# Patient Record
Sex: MALE | Race: WHITE | Employment: FULL TIME | URBAN - METROPOLITAN AREA
[De-identification: names, ages, dates, MRNs, and addresses within clinical notes are randomized per-mention and may not be internally consistent; named-entity substitution may affect disease eponyms.]

---

## 2022-10-27 ENCOUNTER — HOSPITAL ENCOUNTER (OUTPATIENT)
Facility: HOSPITAL | Age: 32
Setting detail: OBSERVATION
Discharge: HOME | End: 2022-10-28
Attending: EMERGENCY MEDICINE | Admitting: INTERNAL MEDICINE
Payer: COMMERCIAL

## 2022-10-27 ENCOUNTER — ANESTHESIA EVENT (OUTPATIENT)
Dept: SURGERY | Facility: HOSPITAL | Age: 32
End: 2022-10-27
Payer: COMMERCIAL

## 2022-10-27 ENCOUNTER — ANESTHESIA (OUTPATIENT)
Dept: SURGERY | Facility: HOSPITAL | Age: 32
End: 2022-10-27
Payer: COMMERCIAL

## 2022-10-27 ENCOUNTER — HOSPITAL ENCOUNTER (OUTPATIENT)
Facility: HOSPITAL | Age: 32
Setting detail: HOSPITAL OUTPATIENT SURGERY
Discharge: HOME | End: 2022-10-27
Attending: ORTHOPAEDIC SURGERY | Admitting: ORTHOPAEDIC SURGERY
Payer: COMMERCIAL

## 2022-10-27 DIAGNOSIS — L03.119 CELLULITIS OF HAND: Primary | ICD-10-CM

## 2022-10-27 LAB
ALBUMIN SERPL-MCNC: 4.1 G/DL (ref 3.4–5)
ALBUMIN/GLOB SERPL: 1.2 {RATIO} (ref 1–2)
ALP LIVER SERPL-CCNC: 33 U/L
ALT SERPL-CCNC: 31 U/L
ANION GAP SERPL CALC-SCNC: 6 MMOL/L (ref 0–18)
APTT PPP: 23.8 SECONDS (ref 23.3–35.6)
AST SERPL-CCNC: 16 U/L (ref 15–37)
BASOPHILS # BLD AUTO: 0.01 X10(3) UL (ref 0–0.2)
BASOPHILS NFR BLD AUTO: 0.1 %
BILIRUB SERPL-MCNC: 0.4 MG/DL (ref 0.1–2)
BUN BLD-MCNC: 12 MG/DL (ref 7–18)
CALCIUM BLD-MCNC: 9.7 MG/DL (ref 8.5–10.1)
CHLORIDE SERPL-SCNC: 108 MMOL/L (ref 98–112)
CO2 SERPL-SCNC: 24 MMOL/L (ref 21–32)
CREAT BLD-MCNC: 1.13 MG/DL
EOSINOPHIL # BLD AUTO: 0 X10(3) UL (ref 0–0.7)
EOSINOPHIL NFR BLD AUTO: 0 %
ERYTHROCYTE [DISTWIDTH] IN BLOOD BY AUTOMATED COUNT: 12.7 %
GFR SERPLBLD BASED ON 1.73 SQ M-ARVRAT: 89 ML/MIN/1.73M2 (ref 60–?)
GLOBULIN PLAS-MCNC: 3.5 G/DL (ref 2.8–4.4)
GLUCOSE BLD-MCNC: 142 MG/DL (ref 70–99)
HCT VFR BLD AUTO: 41.7 %
HGB BLD-MCNC: 14.5 G/DL
IMM GRANULOCYTES # BLD AUTO: 0.04 X10(3) UL (ref 0–1)
IMM GRANULOCYTES NFR BLD: 0.4 %
INR BLD: 0.98 (ref 0.85–1.16)
LYMPHOCYTES # BLD AUTO: 0.49 X10(3) UL (ref 1–4)
LYMPHOCYTES NFR BLD AUTO: 5.4 %
MCH RBC QN AUTO: 32.2 PG (ref 26–34)
MCHC RBC AUTO-ENTMCNC: 34.8 G/DL (ref 31–37)
MCV RBC AUTO: 92.7 FL
MONOCYTES # BLD AUTO: 0.09 X10(3) UL (ref 0.1–1)
MONOCYTES NFR BLD AUTO: 1 %
NEUTROPHILS # BLD AUTO: 8.44 X10 (3) UL (ref 1.5–7.7)
NEUTROPHILS # BLD AUTO: 8.44 X10(3) UL (ref 1.5–7.7)
NEUTROPHILS NFR BLD AUTO: 93.1 %
OSMOLALITY SERPL CALC.SUM OF ELEC: 288 MOSM/KG (ref 275–295)
PLATELET # BLD AUTO: 264 10(3)UL (ref 150–450)
POTASSIUM SERPL-SCNC: 4.2 MMOL/L (ref 3.5–5.1)
PROT SERPL-MCNC: 7.6 G/DL (ref 6.4–8.2)
PROTHROMBIN TIME: 13 SECONDS (ref 11.6–14.8)
RBC # BLD AUTO: 4.5 X10(6)UL
SARS-COV-2 RNA RESP QL NAA+PROBE: NOT DETECTED
SODIUM SERPL-SCNC: 138 MMOL/L (ref 136–145)
WBC # BLD AUTO: 9.1 X10(3) UL (ref 4–11)

## 2022-10-27 PROCEDURE — 87205 SMEAR GRAM STAIN: CPT | Performed by: ORTHOPAEDIC SURGERY

## 2022-10-27 PROCEDURE — 0HBFXZZ EXCISION OF RIGHT HAND SKIN, EXTERNAL APPROACH: ICD-10-PCS | Performed by: ORTHOPAEDIC SURGERY

## 2022-10-27 PROCEDURE — 85610 PROTHROMBIN TIME: CPT | Performed by: EMERGENCY MEDICINE

## 2022-10-27 PROCEDURE — 87077 CULTURE AEROBIC IDENTIFY: CPT | Performed by: ORTHOPAEDIC SURGERY

## 2022-10-27 PROCEDURE — 0H9FXZZ DRAINAGE OF RIGHT HAND SKIN, EXTERNAL APPROACH: ICD-10-PCS | Performed by: ORTHOPAEDIC SURGERY

## 2022-10-27 PROCEDURE — 87070 CULTURE OTHR SPECIMN AEROBIC: CPT | Performed by: ORTHOPAEDIC SURGERY

## 2022-10-27 PROCEDURE — 87075 CULTR BACTERIA EXCEPT BLOOD: CPT | Performed by: ORTHOPAEDIC SURGERY

## 2022-10-27 PROCEDURE — 87176 TISSUE HOMOGENIZATION CULTR: CPT | Performed by: ORTHOPAEDIC SURGERY

## 2022-10-27 PROCEDURE — 96374 THER/PROPH/DIAG INJ IV PUSH: CPT

## 2022-10-27 PROCEDURE — 96375 TX/PRO/DX INJ NEW DRUG ADDON: CPT

## 2022-10-27 PROCEDURE — 80053 COMPREHEN METABOLIC PANEL: CPT | Performed by: EMERGENCY MEDICINE

## 2022-10-27 PROCEDURE — 87186 SC STD MICRODIL/AGAR DIL: CPT | Performed by: ORTHOPAEDIC SURGERY

## 2022-10-27 PROCEDURE — 99285 EMERGENCY DEPT VISIT HI MDM: CPT

## 2022-10-27 PROCEDURE — 85730 THROMBOPLASTIN TIME PARTIAL: CPT | Performed by: EMERGENCY MEDICINE

## 2022-10-27 PROCEDURE — 85025 COMPLETE CBC W/AUTO DIFF WBC: CPT | Performed by: EMERGENCY MEDICINE

## 2022-10-27 RX ORDER — HYDROMORPHONE HYDROCHLORIDE 1 MG/ML
0.4 INJECTION, SOLUTION INTRAMUSCULAR; INTRAVENOUS; SUBCUTANEOUS EVERY 2 HOUR PRN
Status: DISCONTINUED | OUTPATIENT
Start: 2022-10-27 | End: 2022-10-28

## 2022-10-27 RX ORDER — METOCLOPRAMIDE HYDROCHLORIDE 5 MG/ML
INJECTION INTRAMUSCULAR; INTRAVENOUS AS NEEDED
Status: DISCONTINUED | OUTPATIENT
Start: 2022-10-27 | End: 2022-10-27 | Stop reason: SURG

## 2022-10-27 RX ORDER — ONDANSETRON 2 MG/ML
4 INJECTION INTRAMUSCULAR; INTRAVENOUS EVERY 4 HOURS PRN
Status: CANCELLED | OUTPATIENT
Start: 2022-10-27 | End: 2022-10-27

## 2022-10-27 RX ORDER — HYDROCODONE BITARTRATE AND ACETAMINOPHEN 5; 325 MG/1; MG/1
1-2 TABLET ORAL EVERY 6 HOURS PRN
Status: DISCONTINUED | OUTPATIENT
Start: 2022-10-27 | End: 2022-10-27

## 2022-10-27 RX ORDER — MORPHINE SULFATE 4 MG/ML
4 INJECTION, SOLUTION INTRAMUSCULAR; INTRAVENOUS ONCE
Status: COMPLETED | OUTPATIENT
Start: 2022-10-27 | End: 2022-10-27

## 2022-10-27 RX ORDER — MORPHINE SULFATE 2 MG/ML
1 INJECTION, SOLUTION INTRAMUSCULAR; INTRAVENOUS EVERY 2 HOUR PRN
Status: DISCONTINUED | OUTPATIENT
Start: 2022-10-27 | End: 2022-10-28

## 2022-10-27 RX ORDER — HYDROCODONE BITARTRATE AND ACETAMINOPHEN 5; 325 MG/1; MG/1
1 TABLET ORAL EVERY 4 HOURS PRN
Status: ON HOLD | COMMUNITY
Start: 2022-10-26 | End: 2022-10-28

## 2022-10-27 RX ORDER — SULFAMETHOXAZOLE AND TRIMETHOPRIM 800; 160 MG/1; MG/1
1 TABLET ORAL 2 TIMES DAILY
COMMUNITY
Start: 2022-10-26 | End: 2022-10-28

## 2022-10-27 RX ORDER — PANTOPRAZOLE SODIUM 40 MG/1
40 TABLET, DELAYED RELEASE ORAL
Status: DISCONTINUED | OUTPATIENT
Start: 2022-10-28 | End: 2022-10-27

## 2022-10-27 RX ORDER — HYDROMORPHONE HYDROCHLORIDE 1 MG/ML
0.1 INJECTION, SOLUTION INTRAMUSCULAR; INTRAVENOUS; SUBCUTANEOUS EVERY 2 HOUR PRN
Status: DISCONTINUED | OUTPATIENT
Start: 2022-10-27 | End: 2022-10-28

## 2022-10-27 RX ORDER — NALOXONE HYDROCHLORIDE 0.4 MG/ML
80 INJECTION, SOLUTION INTRAMUSCULAR; INTRAVENOUS; SUBCUTANEOUS AS NEEDED
Status: DISCONTINUED | OUTPATIENT
Start: 2022-10-27 | End: 2022-10-27 | Stop reason: HOSPADM

## 2022-10-27 RX ORDER — DEXAMETHASONE SODIUM PHOSPHATE 4 MG/ML
VIAL (ML) INJECTION AS NEEDED
Status: DISCONTINUED | OUTPATIENT
Start: 2022-10-27 | End: 2022-10-27 | Stop reason: SURG

## 2022-10-27 RX ORDER — CEFAZOLIN SODIUM 1 G/3ML
INJECTION, POWDER, FOR SOLUTION INTRAMUSCULAR; INTRAVENOUS AS NEEDED
Status: DISCONTINUED | OUTPATIENT
Start: 2022-10-27 | End: 2022-10-27 | Stop reason: SURG

## 2022-10-27 RX ORDER — HYDROCODONE BITARTRATE AND ACETAMINOPHEN 5; 325 MG/1; MG/1
1 TABLET ORAL ONCE AS NEEDED
Status: COMPLETED | OUTPATIENT
Start: 2022-10-27 | End: 2022-10-27

## 2022-10-27 RX ORDER — MIDAZOLAM HYDROCHLORIDE 1 MG/ML
1 INJECTION INTRAMUSCULAR; INTRAVENOUS EVERY 5 MIN PRN
Status: DISCONTINUED | OUTPATIENT
Start: 2022-10-27 | End: 2022-10-27 | Stop reason: HOSPADM

## 2022-10-27 RX ORDER — ONDANSETRON 2 MG/ML
4 INJECTION INTRAMUSCULAR; INTRAVENOUS EVERY 6 HOURS PRN
Status: DISCONTINUED | OUTPATIENT
Start: 2022-10-27 | End: 2022-10-28

## 2022-10-27 RX ORDER — HYDROCODONE BITARTRATE AND ACETAMINOPHEN 5; 325 MG/1; MG/1
TABLET ORAL
Status: COMPLETED
Start: 2022-10-27 | End: 2022-10-27

## 2022-10-27 RX ORDER — MORPHINE SULFATE 2 MG/ML
0.5 INJECTION, SOLUTION INTRAMUSCULAR; INTRAVENOUS EVERY 2 HOUR PRN
Status: DISCONTINUED | OUTPATIENT
Start: 2022-10-27 | End: 2022-10-28

## 2022-10-27 RX ORDER — HYDROMORPHONE HYDROCHLORIDE 1 MG/ML
0.4 INJECTION, SOLUTION INTRAMUSCULAR; INTRAVENOUS; SUBCUTANEOUS EVERY 5 MIN PRN
Status: DISCONTINUED | OUTPATIENT
Start: 2022-10-27 | End: 2022-10-27 | Stop reason: HOSPADM

## 2022-10-27 RX ORDER — ACETAMINOPHEN 10 MG/ML
1000 INJECTION, SOLUTION INTRAVENOUS EVERY 6 HOURS PRN
Status: DISCONTINUED | OUTPATIENT
Start: 2022-10-27 | End: 2022-10-28

## 2022-10-27 RX ORDER — HYDROCODONE BITARTRATE AND ACETAMINOPHEN 5; 325 MG/1; MG/1
1 TABLET ORAL EVERY 4 HOURS PRN
Status: DISCONTINUED | OUTPATIENT
Start: 2022-10-27 | End: 2022-10-28

## 2022-10-27 RX ORDER — SODIUM CHLORIDE, SODIUM LACTATE, POTASSIUM CHLORIDE, CALCIUM CHLORIDE 600; 310; 30; 20 MG/100ML; MG/100ML; MG/100ML; MG/100ML
INJECTION, SOLUTION INTRAVENOUS CONTINUOUS
Status: DISCONTINUED | OUTPATIENT
Start: 2022-10-27 | End: 2022-10-27 | Stop reason: HOSPADM

## 2022-10-27 RX ORDER — ONDANSETRON 2 MG/ML
4 INJECTION INTRAMUSCULAR; INTRAVENOUS EVERY 6 HOURS PRN
Status: DISCONTINUED | OUTPATIENT
Start: 2022-10-27 | End: 2022-10-27 | Stop reason: HOSPADM

## 2022-10-27 RX ORDER — HYDROMORPHONE HYDROCHLORIDE 1 MG/ML
0.2 INJECTION, SOLUTION INTRAMUSCULAR; INTRAVENOUS; SUBCUTANEOUS EVERY 5 MIN PRN
Status: DISCONTINUED | OUTPATIENT
Start: 2022-10-27 | End: 2022-10-27 | Stop reason: HOSPADM

## 2022-10-27 RX ORDER — CEFAZOLIN SODIUM/WATER 2 G/20 ML
2 SYRINGE (ML) INTRAVENOUS EVERY 8 HOURS
Status: DISCONTINUED | OUTPATIENT
Start: 2022-10-27 | End: 2022-10-27

## 2022-10-27 RX ORDER — HYDROMORPHONE HYDROCHLORIDE 1 MG/ML
0.2 INJECTION, SOLUTION INTRAMUSCULAR; INTRAVENOUS; SUBCUTANEOUS EVERY 2 HOUR PRN
Status: DISCONTINUED | OUTPATIENT
Start: 2022-10-27 | End: 2022-10-28

## 2022-10-27 RX ORDER — HYDROMORPHONE HYDROCHLORIDE 1 MG/ML
0.6 INJECTION, SOLUTION INTRAMUSCULAR; INTRAVENOUS; SUBCUTANEOUS EVERY 5 MIN PRN
Status: DISCONTINUED | OUTPATIENT
Start: 2022-10-27 | End: 2022-10-27 | Stop reason: HOSPADM

## 2022-10-27 RX ORDER — MIDAZOLAM HYDROCHLORIDE 1 MG/ML
INJECTION INTRAMUSCULAR; INTRAVENOUS AS NEEDED
Status: DISCONTINUED | OUTPATIENT
Start: 2022-10-27 | End: 2022-10-27 | Stop reason: SURG

## 2022-10-27 RX ORDER — SODIUM CHLORIDE 9 MG/ML
INJECTION, SOLUTION INTRAVENOUS CONTINUOUS
Status: DISCONTINUED | OUTPATIENT
Start: 2022-10-27 | End: 2022-10-28

## 2022-10-27 RX ORDER — PANTOPRAZOLE SODIUM 40 MG/1
40 TABLET, DELAYED RELEASE ORAL
Status: DISCONTINUED | OUTPATIENT
Start: 2022-10-28 | End: 2022-10-28

## 2022-10-27 RX ORDER — LIDOCAINE HYDROCHLORIDE 10 MG/ML
INJECTION, SOLUTION EPIDURAL; INFILTRATION; INTRACAUDAL; PERINEURAL AS NEEDED
Status: DISCONTINUED | OUTPATIENT
Start: 2022-10-27 | End: 2022-10-27 | Stop reason: SURG

## 2022-10-27 RX ORDER — ACETAMINOPHEN 500 MG
1000 TABLET ORAL ONCE AS NEEDED
Status: COMPLETED | OUTPATIENT
Start: 2022-10-27 | End: 2022-10-27

## 2022-10-27 RX ORDER — MORPHINE SULFATE 4 MG/ML
4 INJECTION, SOLUTION INTRAMUSCULAR; INTRAVENOUS
Status: CANCELLED | OUTPATIENT
Start: 2022-10-27 | End: 2022-10-27

## 2022-10-27 RX ORDER — MORPHINE SULFATE 2 MG/ML
2 INJECTION, SOLUTION INTRAMUSCULAR; INTRAVENOUS EVERY 2 HOUR PRN
Status: DISCONTINUED | OUTPATIENT
Start: 2022-10-27 | End: 2022-10-28

## 2022-10-27 RX ORDER — MEPERIDINE HYDROCHLORIDE 25 MG/ML
25 INJECTION INTRAMUSCULAR; INTRAVENOUS; SUBCUTANEOUS
Status: DISCONTINUED | OUTPATIENT
Start: 2022-10-27 | End: 2022-10-27 | Stop reason: HOSPADM

## 2022-10-27 RX ORDER — KETOROLAC TROMETHAMINE 30 MG/ML
INJECTION, SOLUTION INTRAMUSCULAR; INTRAVENOUS AS NEEDED
Status: DISCONTINUED | OUTPATIENT
Start: 2022-10-27 | End: 2022-10-27 | Stop reason: SURG

## 2022-10-27 RX ORDER — PREDNISONE 10 MG/1
TABLET ORAL
COMMUNITY
Start: 2022-10-05

## 2022-10-27 RX ORDER — HYDROCODONE BITARTRATE AND ACETAMINOPHEN 5; 325 MG/1; MG/1
2 TABLET ORAL ONCE AS NEEDED
Status: COMPLETED | OUTPATIENT
Start: 2022-10-27 | End: 2022-10-27

## 2022-10-27 RX ADMIN — LIDOCAINE HYDROCHLORIDE 10 MG: 10 INJECTION, SOLUTION EPIDURAL; INFILTRATION; INTRACAUDAL; PERINEURAL at 19:37:00

## 2022-10-27 RX ADMIN — DEXAMETHASONE SODIUM PHOSPHATE 4 MG: 4 MG/ML VIAL (ML) INJECTION at 20:06:00

## 2022-10-27 RX ADMIN — KETOROLAC TROMETHAMINE 30 MG: 30 INJECTION, SOLUTION INTRAMUSCULAR; INTRAVENOUS at 20:06:00

## 2022-10-27 RX ADMIN — CEFAZOLIN SODIUM 2 G: 1 INJECTION, POWDER, FOR SOLUTION INTRAMUSCULAR; INTRAVENOUS at 19:54:00

## 2022-10-27 RX ADMIN — METOCLOPRAMIDE HYDROCHLORIDE 10 MG: 5 INJECTION INTRAMUSCULAR; INTRAVENOUS at 20:06:00

## 2022-10-27 RX ADMIN — MIDAZOLAM HYDROCHLORIDE 2 MG: 1 INJECTION INTRAMUSCULAR; INTRAVENOUS at 19:34:00

## 2022-10-27 NOTE — INTERVAL H&P NOTE
Pre-op Diagnosis: RIGHT SHEPPARD SPACE INFECTION    The above referenced H&P was reviewed by Geronimo Wolf MD on 10/27/2022, the patient was examined and no significant changes have occurred in the patient's condition since the H&P was performed. I discussed with the patient and/or legal representative the potential benefits, risks and side effects of this procedure; the likelihood of the patient achieving goals; and potential problems that might occur during recuperation. I discussed reasonable alternatives to the procedure, including risks, benefits and side effects related to the alternatives and risks related to not receiving this procedure. We will proceed with procedure as planned.

## 2022-10-27 NOTE — ED INITIAL ASSESSMENT (HPI)
Pt is here per dr. Walter Brito surgeon to receive IV antibiotics before getting and I & D later today for abscess to right hand.

## 2022-10-27 NOTE — INTERVAL H&P NOTE
Pre-op Diagnosis: RIGHT SHEPPARD SPACE INFECTION    The above referenced H&P was reviewed by Mundo Early MD on 10/27/2022, the patient was examined and no significant changes have occurred in the patient's condition since the H&P was performed. I discussed with the patient and/or legal representative the potential benefits, risks and side effects of this procedure; the likelihood of the patient achieving goals; and potential problems that might occur during recuperation. I discussed reasonable alternatives to the procedure, including risks, benefits and side effects related to the alternatives and risks related to not receiving this procedure. We will proceed with procedure as planned.

## 2022-10-28 VITALS
DIASTOLIC BLOOD PRESSURE: 65 MMHG | TEMPERATURE: 98 F | OXYGEN SATURATION: 99 % | WEIGHT: 214.63 LBS | HEART RATE: 66 BPM | RESPIRATION RATE: 14 BRPM | SYSTOLIC BLOOD PRESSURE: 127 MMHG | BODY MASS INDEX: 26.69 KG/M2 | HEIGHT: 75 IN

## 2022-10-28 LAB
ANION GAP SERPL CALC-SCNC: 6 MMOL/L (ref 0–18)
BASOPHILS # BLD AUTO: 0.03 X10(3) UL (ref 0–0.2)
BASOPHILS NFR BLD AUTO: 0.3 %
BUN BLD-MCNC: 13 MG/DL (ref 7–18)
CALCIUM BLD-MCNC: 9.1 MG/DL (ref 8.5–10.1)
CHLORIDE SERPL-SCNC: 109 MMOL/L (ref 98–112)
CO2 SERPL-SCNC: 25 MMOL/L (ref 21–32)
CREAT BLD-MCNC: 1.02 MG/DL
EOSINOPHIL # BLD AUTO: 0.01 X10(3) UL (ref 0–0.7)
EOSINOPHIL NFR BLD AUTO: 0.1 %
ERYTHROCYTE [DISTWIDTH] IN BLOOD BY AUTOMATED COUNT: 12.9 %
GFR SERPLBLD BASED ON 1.73 SQ M-ARVRAT: 101 ML/MIN/1.73M2 (ref 60–?)
GLUCOSE BLD-MCNC: 124 MG/DL (ref 70–99)
HCT VFR BLD AUTO: 38.9 %
HGB BLD-MCNC: 13.2 G/DL
IMM GRANULOCYTES # BLD AUTO: 0.03 X10(3) UL (ref 0–1)
IMM GRANULOCYTES NFR BLD: 0.3 %
LYMPHOCYTES # BLD AUTO: 1.52 X10(3) UL (ref 1–4)
LYMPHOCYTES NFR BLD AUTO: 13.1 %
MCH RBC QN AUTO: 31.7 PG (ref 26–34)
MCHC RBC AUTO-ENTMCNC: 33.9 G/DL (ref 31–37)
MCV RBC AUTO: 93.5 FL
MONOCYTES # BLD AUTO: 0.85 X10(3) UL (ref 0.1–1)
MONOCYTES NFR BLD AUTO: 7.3 %
NEUTROPHILS # BLD AUTO: 9.13 X10 (3) UL (ref 1.5–7.7)
NEUTROPHILS # BLD AUTO: 9.13 X10(3) UL (ref 1.5–7.7)
NEUTROPHILS NFR BLD AUTO: 78.9 %
OSMOLALITY SERPL CALC.SUM OF ELEC: 292 MOSM/KG (ref 275–295)
PLATELET # BLD AUTO: 240 10(3)UL (ref 150–450)
POTASSIUM SERPL-SCNC: 4.1 MMOL/L (ref 3.5–5.1)
RBC # BLD AUTO: 4.16 X10(6)UL
SODIUM SERPL-SCNC: 140 MMOL/L (ref 136–145)
WBC # BLD AUTO: 11.6 X10(3) UL (ref 4–11)

## 2022-10-28 PROCEDURE — 85025 COMPLETE CBC W/AUTO DIFF WBC: CPT | Performed by: HOSPITALIST

## 2022-10-28 PROCEDURE — 80048 BASIC METABOLIC PNL TOTAL CA: CPT | Performed by: HOSPITALIST

## 2022-10-28 RX ORDER — CEPHALEXIN 500 MG/1
1000 CAPSULE ORAL 3 TIMES DAILY
Qty: 78 CAPSULE | Refills: 0 | Status: SHIPPED
Start: 2022-10-28 | End: 2022-10-28

## 2022-10-28 RX ORDER — HYDROCODONE BITARTRATE AND ACETAMINOPHEN 5; 325 MG/1; MG/1
1 TABLET ORAL EVERY 6 HOURS PRN
Qty: 30 TABLET | Refills: 0 | Status: SHIPPED | OUTPATIENT
Start: 2022-10-28 | End: 2022-10-28

## 2022-10-28 RX ORDER — VANCOMYCIN HYDROCHLORIDE
15 EVERY 12 HOURS
Status: DISCONTINUED | OUTPATIENT
Start: 2022-10-28 | End: 2022-10-28

## 2022-10-28 RX ORDER — HYDROCODONE BITARTRATE AND ACETAMINOPHEN 10; 325 MG/1; MG/1
1 TABLET ORAL EVERY 4 HOURS PRN
Status: DISCONTINUED | OUTPATIENT
Start: 2022-10-28 | End: 2022-10-28

## 2022-10-28 RX ORDER — VANCOMYCIN 2 GRAM/500 ML IN 0.9 % SODIUM CHLORIDE INTRAVENOUS
25 ONCE
Status: COMPLETED | OUTPATIENT
Start: 2022-10-28 | End: 2022-10-28

## 2022-10-28 RX ORDER — HYDROCODONE BITARTRATE AND ACETAMINOPHEN 10; 325 MG/1; MG/1
1-2 TABLET ORAL EVERY 6 HOURS PRN
Qty: 30 TABLET | Refills: 0 | Status: SHIPPED | OUTPATIENT
Start: 2022-10-28

## 2022-10-28 RX ORDER — HYDROCODONE BITARTRATE AND ACETAMINOPHEN 10; 325 MG/1; MG/1
1 TABLET ORAL EVERY 6 HOURS PRN
Status: DISCONTINUED | OUTPATIENT
Start: 2022-10-28 | End: 2022-10-28

## 2022-10-28 RX ORDER — CEPHALEXIN 500 MG/1
1000 CAPSULE ORAL 3 TIMES DAILY
Qty: 78 CAPSULE | Refills: 0 | Status: SHIPPED | OUTPATIENT
Start: 2022-10-28 | End: 2022-11-10

## 2022-10-28 RX ORDER — HYDROCODONE BITARTRATE AND ACETAMINOPHEN 10; 325 MG/1; MG/1
2 TABLET ORAL EVERY 6 HOURS PRN
Status: DISCONTINUED | OUTPATIENT
Start: 2022-10-28 | End: 2022-10-28

## 2022-10-28 RX ORDER — HYDROCODONE BITARTRATE AND ACETAMINOPHEN 10; 325 MG/1; MG/1
2 TABLET ORAL EVERY 4 HOURS PRN
Status: DISCONTINUED | OUTPATIENT
Start: 2022-10-28 | End: 2022-10-28

## 2022-10-28 RX ORDER — SENNA AND DOCUSATE SODIUM 50; 8.6 MG/1; MG/1
1 TABLET, FILM COATED ORAL 2 TIMES DAILY
Qty: 60 TABLET | Refills: 0 | Status: SHIPPED | OUTPATIENT
Start: 2022-10-28

## 2022-10-28 NOTE — ANESTHESIA POSTPROCEDURE EVALUATION
[id number] Patient Status:  Emergency   Age/Gender 32year old male MRN [id number]   [facility] SURGERY Attending Farzaneh Barbosa MD   Hosp Day # 0 PCP Dalbert Merlin, MD       Anesthesia Post-op Note    INCISION AND DRAINAGE OF RIGHT PALM    Procedure Summary     Date: 10/27/22 Room / Location: [facility] MAIN OR 03 / [facility] MAIN OR    Anesthesia Start: 1934 Anesthesia Stop: 2015    Procedure: INCISION AND DRAINAGE OF RIGHT PALM (Right) Diagnosis: (RIGHT SHEPPARD SPACE INFECTION)    Surgeons: Farzaneh Barbosa MD Anesthesiologist: Doreen Manuel MD    Anesthesia Type: general ASA Status: 1          Anesthesia Type: general    Vitals Value Taken Time   /92 10/27/22 2018   Temp 98 10/27/22 2020   Pulse 88 10/27/22 2019   Resp 14 10/27/22 2019   SpO2 97 % 10/27/22 2019   Vitals shown include unvalidated device data. Patient Location: PACU    Anesthesia Type: general    Airway Patency: patent    Postop Pain Control: adequate    Mental Status: mildly sedated but able to meaningfully participate in the post-anesthesia evaluation    Nausea/Vomiting: none    Cardiopulmonary/Hydration status: stable euvolemic    Complications: no apparent anesthesia related complications    Postop vital signs: stable    Dental Exam: Unchanged from Preop    Patient to be discharged from PACU when criteria met.

## 2022-10-28 NOTE — PLAN OF CARE
NURSING ADMISSION NOTE      Patient admitted via bed  Oriented to room. Safety precautions initiated. Bed in low position. Call light in reach. Received pt from PACU after I&D performed to R hand palm. Patient is A&O x4.   Calm and cooperative. VSS. On RA. IV-0.9%  ml started. IV abx. R hand with ace bandage dressing in place-CDI. Maintaining R hand elevated. C/o pain to R hand, Morphine prn and norco prn given per [facility]. Regular diet, denies N/V, tolerated well. Bed at lowest position. Call light within reach. All needs mets at this time.

## 2022-10-28 NOTE — BRIEF OP NOTE
Pre-Operative Diagnosis: RIGHT SHEPPARD SPACE INFECTION     Post-Operative Diagnosis: * No post-op diagnosis entered *      Procedure Performed:   INCISION AND DRAINAGE OF RIGHT PALM    Surgeon(s) and Role:     Nitza Jones MD - Primary    Assistant(s):        Surgical Findings:       Specimen: aerobic and anaerobic cultures     Estimated Blood Loss: No data recorded    Dictation Number:       Cori Ceja MD  10/27/2022  8:03 PM

## 2022-10-28 NOTE — OPERATIVE REPORT
[facility]    PATIENT'S NAME: Christina Kramer   ATTENDING PHYSICIAN: Kelsea Etienne M.D. OPERATING PHYSICIAN: Kelsea Etienne M.D. PATIENT ACCOUNT#:   [de-identified]    LOCATION:  [address] [place] [address]  MEDICAL RECORD #:   [id number]       YOB: 1990  ADMISSION DATE:       10/27/2022      OPERATION DATE:  10/27/2022    OPERATIVE REPORT      PREOPERATIVE DIAGNOSIS:  Thenar space abscess, right palm. POSTOPERATIVE DIAGNOSIS:  Thenar space abscess, right palm. PROCEDURE:  Incision and drainage, abscess of right palm. ANESTHESIA:  General.    OPERATIVE TECHNIQUE:  After induction of anesthesia, the arm was prepped and draped in sterile fashion. The forearm up was exsanguinated and tourniquet elevated to 250 mmHg. An oblique incision was made following the skin lines. A clamp was used to spread down opening the deep tissue and a small amount of purulent material was expressed. This was sent for aerobic and anaerobic cultures. Copious irrigation was performed. There was some ulceration around the superficial skin which was removed. This was sharply debrided with a tenotomy scissors. The dressings then applied and tourniquet let down. Patient was taken to the postanesthesia recovery room in stable condition. No complications.      Dictated By Kelsea Etienne M.D.  d: 10/27/2022 20:11:07  t: 10/27/2022 23:31:02  [facility] [id number]  [facility]/

## 2022-10-28 NOTE — ANESTHESIA PROCEDURE NOTES
Airway  Date/Time: 10/27/2022 7:38 PM  Urgency: elective    Airway not difficult    General Information and Staff    Patient location during procedure: OR  Anesthesiologist: Laurent Lopez MD  Performed: anesthesiologist     Indications and Patient Condition  Indications for airway management: anesthesia  Spontaneous ventilation: present  Sedation level: deep  Preoxygenated: yes  Patient position: sniffing  Mask difficulty assessment: 1 - vent by mask    Final Airway Details  Final airway type: supraglottic airway      Successful airway: classic  Size 4       Number of attempts at approach: 1  Ventilation between attempts: none  Number of other approaches attempted: 0

## 2022-10-28 NOTE — PROGRESS NOTES
[facility] Dosing Service    Initial Pharmacokinetic Consult for Vancomycin AUC Dosing    Moses Hamman is a 32year old patient who is being treated for cellulitis. Pharmacy has been asked to dose vancomycin by Dr Maggy Nolen. Weights:  Ideal body weight: 84.5 kg (186 lb 4.6 oz)  Adjusted ideal body weight: 89.6 kg (197 lb 9.8 oz)  Actual weight:  97.3 kg (214 lb 9.6 oz)    Labs:  Lab Results   Component Value Date    CREATSERUM 1.13 10/27/2022      CrCl:  Estimated Creatinine Clearance: 113.2 mL/min (based on SCr of 1.13 mg/dL). Based on the above:    1. This patient will receive a loading dose of Vancomycin  2000 mg IVPB (25mg/kg, capped at 2000 mg) x 1 dose. This will be followed by 1500 mg Q 12 hours based upon actual body weight of 97.3 kg and renal function. 2. Vancomycin peak and trough will be obtained at steady state in order to calculate AUC24. Goal AUC24 is 400-600 mg-h/L.    3. Pharmacy will order SCr as clinically indicated while on vancomycin to assess renal function. 4. Pharmacy will follow and monitor renal function, toxicity and efficacy. We appreciate the opportunity to assist in the care of this patient.     Mei Moraes, [facility]  10/28/2022  4:58 AM  [address] [place] [address] Extension: [phone]

## 2022-10-28 NOTE — PLAN OF CARE
Assumed care at 0730. Pt a/ox4, VSS, on RA. Pt with pain to right hand, PRN medications per [facility] with relief noted. No c/o SOB, dizziness/lightheadedness, n/v/d. IV zosyn q8h, vanco q12h. Tolerating regular diet. IVF running 0.9NS at 100mL/hr. Pt ok to shower today. Updated on POC. Will continue to monitor. Zosyn and Vanco discontinued by ID. Rocephin q24h ordered. OK to discharge home from ID standpoint.      Problem: Patient/Family Goals  Goal: Patient/Family Long Term Goal  Description: Patient's Long Term Goal:Go home    Interventions:  - Ortho/ID consults  -IV AB  -I&D  - See additional Care Plan goals for specific interventions  Outcome: Progressing  Goal: Patient/Family Short Term Goal  Description: Patient's Short Term Goal: Pain control    Interventions:   - PRN medications  -Elevating RUE  - See additional Care Plan goals for specific interventions  Outcome: Progressing

## 2022-10-28 NOTE — PROGRESS NOTES
NURSING DISCHARGE NOTE    Discharged Home via Wheelchair. Accompanied by Support staff  Belongings Taken by patient/family. Pt discharged home. Discharge paperwork given to pt. Pt understands the importance of f/u and taking medications as prescribed. Paper prescription for norco given to pt, all others electronically sent to pt pharmacy by MD. IV removed. All questions answered.

## 2024-10-12 ENCOUNTER — OFFICE VISIT (OUTPATIENT)
Dept: FAMILY MEDICINE CLINIC | Facility: CLINIC | Age: 34
End: 2024-10-12
Payer: COMMERCIAL

## 2024-10-12 VITALS
SYSTOLIC BLOOD PRESSURE: 138 MMHG | TEMPERATURE: 99 F | HEART RATE: 101 BPM | DIASTOLIC BLOOD PRESSURE: 88 MMHG | OXYGEN SATURATION: 98 %

## 2024-10-12 DIAGNOSIS — H66.001 NON-RECURRENT ACUTE SUPPURATIVE OTITIS MEDIA OF RIGHT EAR WITHOUT SPONTANEOUS RUPTURE OF TYMPANIC MEMBRANE: Primary | ICD-10-CM

## 2024-10-12 PROCEDURE — 3075F SYST BP GE 130 - 139MM HG: CPT | Performed by: PHYSICIAN ASSISTANT

## 2024-10-12 PROCEDURE — 3079F DIAST BP 80-89 MM HG: CPT | Performed by: PHYSICIAN ASSISTANT

## 2024-10-12 PROCEDURE — 99213 OFFICE O/P EST LOW 20 MIN: CPT | Performed by: PHYSICIAN ASSISTANT

## 2024-10-12 RX ORDER — AMOXICILLIN 875 MG
875 TABLET ORAL 2 TIMES DAILY
Qty: 20 TABLET | Refills: 0 | Status: SHIPPED | OUTPATIENT
Start: 2024-10-12 | End: 2024-10-22

## 2024-10-12 NOTE — PROGRESS NOTES
CHIEF COMPLAINT:   No chief complaint on file.      HPI:   Sarthak Hoang is a 33 year old male who presents for upper respiratory symptoms for 3 days. (+) R ear pain, sore throat, nasal congestion, PND, productive cough.  APAP/IBU prn pain, has not taken temp.  Denies prior h/o ENT surgeries.  Social tobacco use.   Denies CP, no SOB/MORAN, no n/v/d, no rash.   Omeprazole OTC daily, no other Rx meds.   Denies h/o asthma or lung dz.   No home COVID-19 testing.     2 younger children at home with URI sx, no other known ill contacts.       Current Outpatient Medications   Medication Sig Dispense Refill    amoxicillin 875 MG Oral Tab Take 1 tablet (875 mg total) by mouth 2 (two) times daily for 10 days. 20 tablet 0    HYDROcodone-acetaminophen  MG Oral Tab Take 1-2 tablets by mouth every 6 (six) hours as needed for Pain. 30 tablet 0    senna-docusate 8.6-50 MG Oral Tab Take 1 tablet by mouth in the morning and 1 tablet before bedtime. Take while needing narcotic pain medications to prevent constipation.. 60 tablet 0    predniSONE 10 MG Oral Tab Day #1-5, #6 tablets (60mg) each day Day #6-10, #4 tablets (40mg) each day Day #11-15, #2 tablets (20 mg) each day Day #10-15, #1 tablets (10mg) each day Day # 16-20, #1 tablets each day **Take all tablets at once with food each MORNING      triamcinolone 0.1 % External Ointment Apply to AA BID x 2 weeks then hold 80 g 1    Omeprazole 40 MG Oral Capsule Delayed Release Take 40 mg by mouth daily.        Past Medical History:    Heartburn      Past Surgical History:   Procedure Laterality Date    Appendectomy           Social History     Socioeconomic History    Marital status:    Tobacco Use    Smoking status: Every Day     Current packs/day: 0.50     Types: Cigarettes    Smokeless tobacco: Never    Tobacco comments:     smokes a pack a week   Vaping Use    Vaping status: Never Used   Substance and Sexual Activity    Alcohol use: Not Currently    Drug use: Never      Social Drivers of Health      Received from [facility]    Housing Stability         REVIEW OF SYSTEMS:   GENERAL: normal appetite  SKIN: no rashes or abnormal skin lesions  HEENT: See HPI  LUNGS: See HPI  CARDIOVASCULAR: denies chest pain or palpitations   GI: denies N/V/C or abdominal pain      EXAM:   /88   Pulse 101   Temp 98.5 °F (36.9 °C) (Axillary)   SpO2 98%   GENERAL: well developed, well nourished,in no apparent distress  SKIN: no rashes,no suspicious lesions  HEAD: atraumatic, normocephalic.    EYES: conjunctiva clear, EOM intact  EARS: R TM injected, bulging. L TM clear.   NOSE: Nostrils patent, clear nasal discharge, nasal mucosa erythematous   THROAT: Oral mucosa pink, moist. Posterior pharynx is (+) erythematous. without exudates. Tonsils 1+, clear post nasal drainage.      NECK: Supple, non-tender  LUNGS: clear to auscultation bilaterally, no wheezes or rhonchi. Breathing is non labored.  CARDIO: RRR without murmur  EXTREMITIES: no cyanosis, clubbing or edema  LYMPH:  shotty ant cervical LAD    ASSESSMENT AND PLAN:   Sarthak Hoang is a 33 year old male who presents with upper respiratory symptoms that are consistent with    ASSESSMENT:   Encounter Diagnosis   Name Primary?    Non-recurrent acute suppurative otitis media of right ear without spontaneous rupture of tympanic membrane Yes       PLAN:   Meds as below.  Comfort care as described in Patient Instructions    Meds & Refills for this Visit:  Requested Prescriptions     Signed Prescriptions Disp Refills    amoxicillin 875 MG Oral Tab 20 tablet 0     Sig: Take 1 tablet (875 mg total) by mouth 2 (two) times daily for 10 days.     Risks, benefits, and side effects of medication explained and discussed.    The patient indicates understanding of these issues and agrees to the plan.  The patient is asked to f/u with PCP if sx's persist or worsen.  Patient Instructions    Amoxicillin twice daily for 10 days.    Recommend over the counter decongestant for nasal congestion/post nasal drainage (pseudoephedrine (I.e. Sudafed, generic okay) get from pharmacist with state ID).   Encourage fluids, humidifier/vaporizor at bedside, elevate head of bed (sleep with extra pillow), vapor rub to chest, steam therapy if no fever, warm compresses for sinus pressure if no fever, salt water gargles for sore throat, lozenges for sore throat, may try over the counter saline nasal spray or irrigation kit (use distilled water with irrigation kit) for sinus pressure/congestion, get plenty of rest.    Ibuprofen and/or tylenol for pain and/or fever.    Ibuprofen has added benefit of being anti-inflammatory, take with food to avoid stomach upset.     Follow up with your primary care provider if your symptoms fail to improve and resolve as anticipated    Go to the Immediate Care or Emergency Department in event of new or worsening symptoms at any time       Veena La PA-C

## 2024-10-12 NOTE — PATIENT INSTRUCTIONS
Amoxicillin twice daily for 10 days.   Recommend over the counter decongestant for nasal congestion/post nasal drainage (pseudoephedrine (I.e. Sudafed, generic okay) get from pharmacist with state ID).   Encourage fluids, humidifier/vaporizor at bedside, elevate head of bed (sleep with extra pillow), vapor rub to chest, steam therapy if no fever, warm compresses for sinus pressure if no fever, salt water gargles for sore throat, lozenges for sore throat, may try over the counter saline nasal spray or irrigation kit (use distilled water with irrigation kit) for sinus pressure/congestion, get plenty of rest.    Ibuprofen and/or tylenol for pain and/or fever.    Ibuprofen has added benefit of being anti-inflammatory, take with food to avoid stomach upset.     Follow up with your primary care provider if your symptoms fail to improve and resolve as anticipated    Go to the Immediate Care or Emergency Department in event of new or worsening symptoms at any time

## 2024-10-18 ENCOUNTER — OFFICE VISIT (OUTPATIENT)
Dept: FAMILY MEDICINE CLINIC | Facility: CLINIC | Age: 34
End: 2024-10-18
Payer: COMMERCIAL

## 2024-10-18 VITALS
OXYGEN SATURATION: 99 % | RESPIRATION RATE: 18 BRPM | DIASTOLIC BLOOD PRESSURE: 82 MMHG | HEART RATE: 102 BPM | WEIGHT: 205 LBS | TEMPERATURE: 98 F | SYSTOLIC BLOOD PRESSURE: 134 MMHG | BODY MASS INDEX: 25.49 KG/M2 | HEIGHT: 75 IN

## 2024-10-18 DIAGNOSIS — H65.91 OME (OTITIS MEDIA WITH EFFUSION), RIGHT: ICD-10-CM

## 2024-10-18 DIAGNOSIS — H66.91 RECURRENT OTITIS MEDIA, RIGHT: Primary | ICD-10-CM

## 2024-10-18 DIAGNOSIS — H91.91 DECREASED HEARING, RIGHT: ICD-10-CM

## 2024-10-18 PROCEDURE — 3079F DIAST BP 80-89 MM HG: CPT | Performed by: PHYSICIAN ASSISTANT

## 2024-10-18 PROCEDURE — 3075F SYST BP GE 130 - 139MM HG: CPT | Performed by: PHYSICIAN ASSISTANT

## 2024-10-18 PROCEDURE — 99213 OFFICE O/P EST LOW 20 MIN: CPT | Performed by: PHYSICIAN ASSISTANT

## 2024-10-18 PROCEDURE — 3008F BODY MASS INDEX DOCD: CPT | Performed by: PHYSICIAN ASSISTANT

## 2024-10-18 RX ORDER — CEFDINIR 300 MG/1
300 CAPSULE ORAL 2 TIMES DAILY
Qty: 20 CAPSULE | Refills: 0 | Status: SHIPPED | OUTPATIENT
Start: 2024-10-18 | End: 2024-10-28

## 2024-10-18 RX ORDER — FLUTICASONE PROPIONATE 50 MCG
2 SPRAY, SUSPENSION (ML) NASAL DAILY
Qty: 1 EACH | Refills: 0 | Status: SHIPPED | OUTPATIENT
Start: 2024-10-18 | End: 2025-10-13

## 2024-10-18 NOTE — PATIENT INSTRUCTIONS
Stop Amoxicillin.  Start Cefdinir 300 mg twice daily for 10 days.  Recommend probiotics while on this medication to prevent antibiotic associated diarrhea or yeast infections.  Examples include yogurt with active live cultures; or in capsule/granule forms such as Florastor, Culturelle, Aligne or acidophilus.    Flonase;  2 sprays in each nostril daily, or 1 spray in each nostril twice daily.  If you develop a nosebleed, stop medication and restart at half the dose (1 spray in each nostril daily) after you have not had a nosebleed for 2 days.  If nosebleed recurs, then stop medication completely.   Recommend over the counter NSAID (ibuprofen or Aleve) for inflammation.   Take with food, stop if stomach upset as this can worsen GERD/heartburn.       Follow up with your primary care provider if your symptoms fail to improve and resolve as anticipated    Go to the Immediate Care or Emergency Department in event of new or worsening symptoms at any time

## 2024-12-09 ENCOUNTER — HOSPITAL ENCOUNTER (OUTPATIENT)
Dept: GENERAL RADIOLOGY | Age: 34
Discharge: HOME | End: 2024-12-09
Attending: NURSE PRACTITIONER
Payer: COMMERCIAL

## 2024-12-09 ENCOUNTER — OFFICE VISIT (OUTPATIENT)
Dept: FAMILY MEDICINE CLINIC | Facility: CLINIC | Age: 34
End: 2024-12-09
Payer: COMMERCIAL

## 2024-12-09 VITALS
OXYGEN SATURATION: 99 % | BODY MASS INDEX: 25.49 KG/M2 | TEMPERATURE: 98 F | WEIGHT: 205 LBS | RESPIRATION RATE: 18 BRPM | DIASTOLIC BLOOD PRESSURE: 88 MMHG | HEART RATE: 90 BPM | SYSTOLIC BLOOD PRESSURE: 131 MMHG | HEIGHT: 75 IN

## 2024-12-09 DIAGNOSIS — J06.9 VIRAL URI: ICD-10-CM

## 2024-12-09 DIAGNOSIS — R05.1 ACUTE COUGH: Primary | ICD-10-CM

## 2024-12-09 DIAGNOSIS — R05.1 ACUTE COUGH: ICD-10-CM

## 2024-12-09 PROCEDURE — 71046 X-RAY EXAM CHEST 2 VIEWS: CPT | Performed by: NURSE PRACTITIONER

## 2024-12-09 RX ORDER — BENZONATATE 200 MG/1
200 CAPSULE ORAL 3 TIMES DAILY PRN
Qty: 20 CAPSULE | Refills: 0 | Status: SHIPPED | OUTPATIENT
Start: 2024-12-09

## 2024-12-09 RX ORDER — ALBUTEROL SULFATE 90 UG/1
2 INHALANT RESPIRATORY (INHALATION) EVERY 4 HOURS PRN
Qty: 1 EACH | Refills: 0 | Status: SHIPPED | OUTPATIENT
Start: 2024-12-09

## 2024-12-09 NOTE — PATIENT INSTRUCTIONS
1. Rest. Drink plenty of fluids.     2. Supportive care as discussed. Albuterol inhaler and benzonatate as prescribed.    3. Chest XR negative.     4. Follow up with PMD in 4-5 days for re-eval. Go to the emergency department immediately if symptoms worsen, change, you develop chest discomfort, wheezing, shortness of breath, or if you have any concerns.

## 2024-12-09 NOTE — PROGRESS NOTES
CHIEF COMPLAINT:     Chief Complaint   Patient presents with    Cough     Chills, body aches, nasal congesiton.       HPI:   Sarthak Hoang is a 33 year old male who presents for nasal congestion/runny nose, and cough. Patient reports symptoms present for 6 days. Notes tactile fevers.  Denies any wheezing, chest discomfort, or shortness of breath.  Notes occasional bad coughing spells. Treating symptoms with otc cold meds.   Tolerates PO well at home. No n/v/d.  Denies any other aggravating or relieving factors at home. Denies any other treatment attempts prior to arrival.   Denies known covid-19 or strep exposure.     Current Outpatient Medications   Medication Sig Dispense Refill    fluticasone propionate 50 MCG/ACT Nasal Suspension 2 sprays by Each Nare route daily. 1 each 0    triamcinolone 0.1 % External Ointment Apply to AA BID x 2 weeks then hold 80 g 1    Omeprazole 40 MG Oral Capsule Delayed Release Take 1 capsule (40 mg total) by mouth daily.        Past Medical History:    Heartburn      Past Surgical History:   Procedure Laterality Date    Appendectomy           Social History     Socioeconomic History    Marital status:    Tobacco Use    Smoking status: Every Day     Current packs/day: 0.50     Types: Cigarettes    Smokeless tobacco: Never    Tobacco comments:     smokes a pack a week   Vaping Use    Vaping status: Never Used   Substance and Sexual Activity    Alcohol use: Not Currently    Drug use: Never     Social Drivers of Health      Received [facility]    Housing Stability         REVIEW OF SYSTEMS:   GENERAL: + tactile fevers. Notes good appetite  SKIN: no rashes or abnormal skin lesions  HEENT: Dnies sore throat, + sinus symptoms, Denies ear pain  LUNGS: + nonproductive cough, denies shortness of breath or wheezing,   CARDIOVASCULAR: denies chest pain or palpitations   GI: denies N/V/C or abdominal pain  NEURO: Denies headaches    EXAM:   /88   Pulse 90    Temp 97.9 °F (36.6 °C)   Resp 18   Ht 6' 3\" (1.905 m)   Wt 205 lb (93 kg)   SpO2 99%   BMI 25.62 kg/m²   GENERAL: well developed, well nourished,in no apparent distress  SKIN: no rashes,no suspicious lesions  HEAD: atraumatic, normocephalic.    EYES: conjunctiva clear  EARS: TM's intact and without erythema, no bulging, no retraction,no fluid, bony landmarks visualized. No erythema or swelling noted to ear canals or external ears.   NOSE: Nostrils patent, clear nasal discharge, nasal mucosa reddened   THROAT: Oral mucosa pink, moist. Posterior pharynx is mildly erythematous. No exudates. No tonsillar hypertrophy noted.  No trismus. Uvula midline with no swelling. Voice clear/normal. No stridor  NECK: Supple, non-tender  LUNGS: + nonproductive cough.  no rales, wheezes. Breathing is non labored. Good airmovement.  CARDIO: RRR without murmur  EXTREMITIES: no cyanosis, clubbing or edema  LYMPH:  No lymphadenopathy.        ASSESSMENT AND PLAN:       ICD-10-CM    1. Acute cough  R05.1 XR CHEST PA + LAT CHEST (CPT=71046)     albuterol 108 (90 Base) MCG/ACT Inhalation Aero Soln     benzonatate 200 MG Oral Cap      2. Viral URI  J06.9               XR Chest: wnl    Viral panel declined by pt.    Discussed physical exam and hpi with pt. No bacterial focus noted on exam. Pt has reassuring physical exam consistent with cough and uri symtpoms.No respiratory distress noted. Treatment options discussed with patient and explained in detail. We reviewed symptomatic care at home and will start prn benzonatate and albuterol for cough. The risks, benefits and potential side effects of possible medications were reviewed. Alternatives were discussed. Monitoring parameters and expected course outlined.  Patient to call PCP or go to emergency department if symptoms fail to respond as outlined, or worsen in any way. The patient agreed with the plan.    Patient Instructions   1. Rest. Drink plenty of fluids.     2. Supportive care  as discussed. Albuterol inhaler and benzonatate as prescribed.    3. Chest XR negative.     4. Follow up with PMD in 4-5 days for re-eval. Go to the emergency department immediately if symptoms worsen, change, you develop chest discomfort, wheezing, shortness of breath, or if you have any concerns.

## 2025-05-10 ENCOUNTER — OFFICE VISIT (OUTPATIENT)
Dept: FAMILY MEDICINE CLINIC | Facility: CLINIC | Age: 35
End: 2025-05-10
Payer: COMMERCIAL

## 2025-05-10 VITALS
WEIGHT: 194 LBS | BODY MASS INDEX: 24.12 KG/M2 | HEART RATE: 97 BPM | RESPIRATION RATE: 16 BRPM | DIASTOLIC BLOOD PRESSURE: 96 MMHG | TEMPERATURE: 97 F | OXYGEN SATURATION: 99 % | SYSTOLIC BLOOD PRESSURE: 120 MMHG | HEIGHT: 75 IN

## 2025-05-10 DIAGNOSIS — Z11.3 SCREENING FOR STDS (SEXUALLY TRANSMITTED DISEASES): ICD-10-CM

## 2025-05-10 DIAGNOSIS — Z71.6 ENCOUNTER FOR TOBACCO USE CESSATION COUNSELING: ICD-10-CM

## 2025-05-10 DIAGNOSIS — Z13.220 LIPID SCREENING: ICD-10-CM

## 2025-05-10 DIAGNOSIS — Z00.00 WELLNESS EXAMINATION: Primary | ICD-10-CM

## 2025-05-10 PROBLEM — M79.89: Status: RESOLVED | Noted: 2022-10-26 | Resolved: 2025-05-10

## 2025-05-10 PROBLEM — B99.9: Status: RESOLVED | Noted: 2022-10-26 | Resolved: 2025-05-10

## 2025-06-12 ENCOUNTER — PATIENT MESSAGE (OUTPATIENT)
Dept: FAMILY MEDICINE CLINIC | Facility: CLINIC | Age: 35
End: 2025-06-12

## 2025-06-13 NOTE — TELEPHONE ENCOUNTER
Patient returned call and confirmed omeprazole is the only daily medication he takes.    Form in Dr Chuy reilly

## 2025-08-04 ENCOUNTER — APPOINTMENT (OUTPATIENT)
Dept: GENERAL RADIOLOGY | Facility: HOSPITAL | Age: 35
End: 2025-08-04
Attending: EMERGENCY MEDICINE

## 2025-08-04 PROCEDURE — 73140 X-RAY EXAM OF FINGER(S): CPT | Performed by: EMERGENCY MEDICINE

## (undated) DEVICE — STERILE POLYISOPRENE POWDER-FREE SURGICAL GLOVES: Brand: PROTEXIS

## (undated) DEVICE — SPNG GZ W4XL4IN COT 12 PLY TYP

## (undated) DEVICE — PADDING CAST SOFT ROLL 3IN

## (undated) DEVICE — DISPOSABLE TOURNIQUET CUFF SINGLE BLADDER, DUAL PORT AND QUICK CONNECT CONNECTOR: Brand: COLOR CUFF

## (undated) DEVICE — SOLUTION  .9 1000ML BTL

## (undated) DEVICE — STOCK SUR W9XL48IN IMPERV

## (undated) DEVICE — GOWN,SIRUS,FABRIC-REINFORCED,X-LARGE: Brand: MEDLINE

## (undated) DEVICE — UPPER EXTREMITY CDS-LF: Brand: MEDLINE INDUSTRIES, INC.

## (undated) DEVICE — STANDARD HYPODERMIC NEEDLE,POLYPROPYLENE HUB: Brand: MONOJECT

## (undated) DEVICE — SLEEVE KENDALL SCD EXPRESS MED